# Patient Record
Sex: MALE | Race: OTHER | HISPANIC OR LATINO | URBAN - METROPOLITAN AREA
[De-identification: names, ages, dates, MRNs, and addresses within clinical notes are randomized per-mention and may not be internally consistent; named-entity substitution may affect disease eponyms.]

---

## 2021-02-05 ENCOUNTER — EMERGENCY (EMERGENCY)
Facility: HOSPITAL | Age: 26
LOS: 1 days | Discharge: HOME | End: 2021-02-05
Attending: EMERGENCY MEDICINE
Payer: MEDICAID

## 2021-02-05 VITALS
HEIGHT: 64 IN | DIASTOLIC BLOOD PRESSURE: 78 MMHG | SYSTOLIC BLOOD PRESSURE: 126 MMHG | RESPIRATION RATE: 16 BRPM | WEIGHT: 149.91 LBS | TEMPERATURE: 98 F | HEART RATE: 80 BPM | OXYGEN SATURATION: 99 %

## 2021-02-05 PROCEDURE — 99284 EMERGENCY DEPT VISIT MOD MDM: CPT

## 2021-02-05 PROCEDURE — 99053 MED SERV 10PM-8AM 24 HR FAC: CPT

## 2021-02-05 RX ORDER — KETOROLAC TROMETHAMINE 30 MG/ML
30 SYRINGE (ML) INJECTION ONCE
Refills: 0 | Status: DISCONTINUED | OUTPATIENT
Start: 2021-02-05 | End: 2021-02-05

## 2021-02-05 NOTE — ED PROVIDER NOTE - OBJECTIVE STATEMENT
25 year old male denies PMH coming in with left testicular pain since last night that hasn't improved. states intermittently felt dysuria but not all the time. sexually active and uses condoms sometimes. denies swelling, skin changes to scrotum. never had this before. denies trauma. denies all other complaints.

## 2021-02-05 NOTE — ED PROVIDER NOTE - CLINICAL SUMMARY MEDICAL DECISION MAKING FREE TEXT BOX
25 year old male with left testicular pain. vitals WNL. PE as above.  US testes to r/o torsion, UA, gc/chlamydia, pain control

## 2021-02-05 NOTE — ED PROVIDER NOTE - NSFOLLOWUPINSTRUCTIONS_ED_ALL_ED_FT
Log Out.      [employer]® CareNotes®     :  [facility]  	                       TESTICLE PAIN - AfterCare(R) Instructions(ER/ED)           Dolor testicular    LO QUE NECESITA SABER:    El dolor testicular podría comenzar en song escroto y extenderse a song abdomen. Es probable que usted sienta un dolor rohan y repentino, o dolor sordo que sucede con el paso del tiempo. El dolor en amaya testículos podría ir y venir o podría durar por mucho tiempo. La causa de song dolor podría ser desconocida. La causa del dolor en amaya testículos podría ser jai infección, trauma, hernia, cálculos renales, o enfermedades de transmisión sexual (ETS). Usted podría tener jai protuberancia dolorosa en el escroto. La protuberancia podría ser causada por un engrandecimiento de vena o por líquido que se ha acumulado alrededor de ghazala de amaya testículos. Esta protuberancia también podría ser causada por jai condición médica aún más seria. Parte de song testículo podría torcerse. Esta es jai condición seria que necesita tratamiento tan pronto ramandeep sea posible.    INSTRUCCIONES SOBRE EL LUPIS HOSPITALARIA:    Medicamentos:  •Antibióticos:Estos medicamentos ayudan a combatir o prevenir infecciones. [place] amaya antibióticos hasta que se los termine, aunque se sienta mejor.      •Analgésicos:Es posible que le receten un medicamento para aliviar el dolor. No espere hasta que el dolor sea severo antes de florinda lachelle medicamento.      •AINEs (analgésicos antiinflamatorios no esteroides):Estos medicamentos disminuyen la inflamación, dolor y fiebre. Los AINEs se pueden obtener sin receta médica. Consulte con song médico cuál medicamento es el adecuado para usted. Pregunte cuánto florinda y cuándo. Tómelos ramandeep se le indique. Cuando no se suzie de la manera indicada, los medicamentos antiinflamatorios no esteroides pueden causar sangrado estomacal y problemas renales.      •[place] amaya medicamentos ramandeep se le haya indicado.Consulte con song médico si usted eboni que song medicamento no le está ayudando o si presenta efectos secundarios. Infórmele si es alérgico a cualquier medicamento. Mantenga jai lista actualizada de los medicamentos, las vitaminas y los productos herbales que sharona. Incluya los siguientes datos de los medicamentos: cantidad, frecuencia y motivo de administración. Traiga con usted la lista o los envases de las píldoras a amaya citas de seguimiento. Lleve la lista de los medicamentos con usted en eduardo de jai emergencia.      Disminuya el malestar:Con tratamiento, song dolor podría mejorar en 1 a 3 días. Song condición podría florinda hasta 4 semanas para sanar, dependiendo de la causa del dolor que usted siente en los testículos.   •Descanse:Limite song actividad hasta que song dolor haya disminuido. Descanse más mientras gloria. No se siente por largos periodos de tiempo.      •Compresas frías:Póngase jai compresa fría en los testículos para ayudar a aliviar el dolor. Use compresas frías ramandeep se le indica.      •Elevación:Suavemente ponga jai toalla doblada debajo de amaya testículos para elevarlos cuando usted se sienta en jai silla o se acuesta en jai cama. [place] le ayudará a aliviar song dolor y a disminuir la inflamación.      Acuda a jai consulta de control con song médico o song urólogo dentro de 3 a 7 días:Anote amaya preguntas para que se acuerde de hacerlas sukhi amaya visitas.    Relaciones sexuales:Evite la actividad sexual hasta que haya terminado de florinda amaya antibióticos o hasta que song médico le indique que puede tener contacto sexual de manera dunne. Use condones para reducir el riesgo de contraer enfermedades de transmisión sexual.    Comuníquese con song médico o urólogo si:  •Usted siente que song medicamento o tratamiento no está funcionando.      •Usted siente más dolor, sensibilidad o inflamación que antes.      •Usted tiene náuseas o fiebre leve.      •Usted tiene preguntas o inquietudes acerca de song condición o cuidado.      Regrese a la jaxon de emergencias si:  •Usted siente dolor repentino o intenso en los testículos o abdomen.      •Usted siente dolor en ambos testículos.      •Usted está vomitando.      •Usted tiene fiebre lupis.      •Song dolor aumenta cuando amaya testículos están en jai posición elevada.      •Song escroto se pone color tram. [place] podría indicar que song testículo no está recibiendo el flujo de kenyatta que necesita.         © Copyright [employer] 2021           back to top                          © Copyright [employer] 2021

## 2021-02-05 NOTE — ED PROVIDER NOTE - PATIENT PORTAL LINK FT
You can access the FollowMyHealth Patient Portal offered by [facility] by registering at the following website: [email or web address]. By joining [employer]’s FollowMyHealth portal, you will also be able to view your health information using other applications (apps) compatible with our system.

## 2021-02-05 NOTE — ED PROVIDER NOTE - PHYSICAL EXAMINATION
penis uncircumcised, WNL.  left testicle: normal lie, generalized ttp, no significant swelling.  right testicle WNL.  scrotum WNL

## 2021-02-05 NOTE — ED PROVIDER NOTE - PROGRESS NOTE DETAILS
pain improved. US testes no torsion. UA no uti. possibly STI- will treat. f/u with PCP. return precautions discussed.

## 2021-02-06 VITALS
SYSTOLIC BLOOD PRESSURE: 107 MMHG | DIASTOLIC BLOOD PRESSURE: 62 MMHG | HEART RATE: 63 BPM | TEMPERATURE: 99 F | RESPIRATION RATE: 16 BRPM | OXYGEN SATURATION: 99 %

## 2021-02-06 LAB
APPEARANCE UR: CLEAR — SIGNIFICANT CHANGE UP
BILIRUB UR-MCNC: NEGATIVE — SIGNIFICANT CHANGE UP
C TRACH RRNA SPEC QL NAA+PROBE: SIGNIFICANT CHANGE UP
COLOR SPEC: YELLOW — SIGNIFICANT CHANGE UP
DIFF PNL FLD: ABNORMAL
GLUCOSE UR QL: NEGATIVE — SIGNIFICANT CHANGE UP
KETONES UR-MCNC: NEGATIVE — SIGNIFICANT CHANGE UP
LEUKOCYTE ESTERASE UR-ACNC: NEGATIVE — SIGNIFICANT CHANGE UP
N GONORRHOEA RRNA SPEC QL NAA+PROBE: SIGNIFICANT CHANGE UP
NITRITE UR-MCNC: NEGATIVE — SIGNIFICANT CHANGE UP
PH UR: 7 — SIGNIFICANT CHANGE UP (ref 5–8)
PROT UR-MCNC: NEGATIVE — SIGNIFICANT CHANGE UP
SP GR SPEC: 1.01 — SIGNIFICANT CHANGE UP (ref 1.01–1.02)
SPECIMEN SOURCE: SIGNIFICANT CHANGE UP
UROBILINOGEN FLD QL: NEGATIVE — SIGNIFICANT CHANGE UP

## 2021-02-06 PROCEDURE — 81001 URINALYSIS AUTO W/SCOPE: CPT

## 2021-02-06 PROCEDURE — 76870 US EXAM SCROTUM: CPT | Mod: 26

## 2021-02-06 PROCEDURE — 76870 US EXAM SCROTUM: CPT

## 2021-02-06 PROCEDURE — 99284 EMERGENCY DEPT VISIT MOD MDM: CPT | Mod: 25

## 2021-02-06 PROCEDURE — 87086 URINE CULTURE/COLONY COUNT: CPT

## 2021-02-06 PROCEDURE — 87591 N.GONORRHOEAE DNA AMP PROB: CPT

## 2021-02-06 PROCEDURE — 87491 CHLMYD TRACH DNA AMP PROBE: CPT

## 2021-02-06 RX ORDER — CEFTRIAXONE 500 MG/1
250 INJECTION, POWDER, FOR SOLUTION INTRAMUSCULAR; INTRAVENOUS ONCE
Refills: 0 | Status: COMPLETED | OUTPATIENT
Start: 2021-02-06 | End: 2021-02-06

## 2021-02-06 RX ORDER — AZITHROMYCIN 500 MG/1
1000 TABLET, FILM COATED ORAL ONCE
Refills: 0 | Status: COMPLETED | OUTPATIENT
Start: 2021-02-06 | End: 2021-02-06

## 2021-02-06 RX ADMIN — Medication 30 MILLIGRAM(S): at 01:20

## 2021-02-06 RX ADMIN — AZITHROMYCIN 1000 MILLIGRAM(S): 500 TABLET, FILM COATED ORAL at 01:20

## 2021-02-06 RX ADMIN — CEFTRIAXONE 250 MILLIGRAM(S): 500 INJECTION, POWDER, FOR SOLUTION INTRAMUSCULAR; INTRAVENOUS at 01:20

## 2021-02-07 LAB
CULTURE RESULTS: SIGNIFICANT CHANGE UP
SPECIMEN SOURCE: SIGNIFICANT CHANGE UP

## 2024-04-10 NOTE — ED ADULT TRIAGE NOTE - ESI TRIAGE ACUITY LEVEL, MLM
2 Rinvoq Pregnancy And Lactation Text: Based on animal studies, Rinvoq may cause embryo-fetal harm when administered to pregnant women.  The medication should not be used in pregnancy.  Breastfeeding is not recommended during treatment and for 6 days after the last dose.